# Patient Record
Sex: MALE | Race: ASIAN
[De-identification: names, ages, dates, MRNs, and addresses within clinical notes are randomized per-mention and may not be internally consistent; named-entity substitution may affect disease eponyms.]

---

## 2020-01-02 ENCOUNTER — HOSPITAL ENCOUNTER (OUTPATIENT)
Dept: HOSPITAL 74 - FASU | Age: 65
Discharge: HOME | End: 2020-01-02
Attending: ORTHOPAEDIC SURGERY
Payer: COMMERCIAL

## 2020-01-02 VITALS — SYSTOLIC BLOOD PRESSURE: 121 MMHG | DIASTOLIC BLOOD PRESSURE: 65 MMHG

## 2020-01-02 VITALS — TEMPERATURE: 98 F | HEART RATE: 57 BPM

## 2020-01-02 VITALS — BODY MASS INDEX: 26.4 KG/M2

## 2020-01-02 DIAGNOSIS — M67.442: Primary | ICD-10-CM

## 2020-01-02 PROCEDURE — 0RBX0ZZ EXCISION OF LEFT FINGER PHALANGEAL JOINT, OPEN APPROACH: ICD-10-PCS | Performed by: ORTHOPAEDIC SURGERY

## 2020-01-02 NOTE — OP
DATE OF OPERATION:  01/02/2020

 

PREOPERATIVE DIAGNOSIS:  Left ring finger mass.

 

POSTOPERATIVE DIAGNOSIS:  Left ring finger mass.

 

OPERATIVE PROCEDURE:  Left ring finger mass excision.

 

SURGEON:  Zoltan Rice MD

 

ANESTHESIA:  Local with sedation.

 

COMPLICATIONS:  None.

 

ESTIMATED BLOOD LOSS:  Minimal.

 

INDICATION FOR PROCEDURE:  The patient is a 64-year-old male with above finding,

indicated for operative treatment.  Risks, benefits, and alternatives were discussed

with patient at length.   Proper informed consent was obtained.  

 

PROCEDURE:  After proper identification of patient and correct operative site,

patient was brought to operating room, placed on operating table, all prominences

well padded.  Sedation and local anesthesia were given.  The left upper extremity was

prepped and draped in usual sterile fashion.  A well-padded tourniquet was placed

over the sterile prep.  Esmarch bandage used to exsanguinate left upper extremity. 

Tourniquet was inflated to 250 mmHg.  A curvilinear incision was made over the dorsal

aspect of the PIP joint, over the ring finger, which is where the mass was present. 

Blunt and sharp dissection was performed through the subcutaneous tissues.  The mass

was found to be a cystic structure emanating from the dorsal aspect of the PIP joint

and it was excised in whole along with its stalk and sent for pathologic evaluation. 

The wound was irrigated, repaired with 5-0 fast-absorbing plain gut suture.  Sterile

dressings were applied.  The patient was brought to the recovery room in stable

condition.  He tolerated the procedure well.

 

 

 

ZOLTAN RICE M.D.

 

[id number]

DD: 01/02/2020 12:50

DT: 01/02/2020 13:51

Job #:  [id number]

## 2020-01-07 NOTE — PATH
Surgical Pathology Report



Patient Name:  CODIE BENNETT

Accession #:  [id number]

Med. Rec. #:  [id number]                                                        

   /Age/Gender:  1955 (Age: 64) / M

Account:  [id number]                                                          

             Location: [facility] 

Taken:  2020

Received:  2020

Reported:  2020

Physicians:  Zoltan Liriano M.D.

  



Specimen(s) Received

 MASS LEFT RING FINGER 





Clinical History

Mass left ring finger







Final Diagnosis

RING FINGER, LEFT, MASS, EXCISION:

GANGLION CYST.





***Electronically Signed***

Aster Wren M.D.





Gross Description

Received in formalin labeled "mass left ring finger," is a 1.3 x 0.7 x 0.2 cm

tan portion of soft tissue, possibly consistent with a cyst. The specimen is

submitted in toto in one cassette.

/1/3/2020



saudi/1/3/2020